# Patient Record
Sex: FEMALE | Race: WHITE | Employment: OTHER | URBAN - METROPOLITAN AREA
[De-identification: names, ages, dates, MRNs, and addresses within clinical notes are randomized per-mention and may not be internally consistent; named-entity substitution may affect disease eponyms.]

---

## 2017-01-11 ENCOUNTER — HOSPITAL ENCOUNTER (OUTPATIENT)
Dept: MAMMOGRAPHY | Age: 67
Discharge: HOME | End: 2017-01-11
Attending: NURSE PRACTITIONER
Payer: MEDICARE

## 2017-01-11 DIAGNOSIS — Z12.31 VISIT FOR SCREENING MAMMOGRAM: ICD-10-CM

## 2017-01-11 PROCEDURE — 77067 SCR MAMMO BI INCL CAD: CPT

## 2017-09-05 ENCOUNTER — HOSPITAL ENCOUNTER (OUTPATIENT)
Dept: ULTRASOUND IMAGING | Age: 67
Discharge: HOME | End: 2017-09-05
Attending: NURSE PRACTITIONER
Payer: MEDICARE

## 2017-09-05 ENCOUNTER — HOSPITAL ENCOUNTER (OUTPATIENT)
Dept: MAMMOGRAPHY | Age: 67
Discharge: HOME | End: 2017-09-05
Attending: NURSE PRACTITIONER
Payer: MEDICARE

## 2017-09-05 DIAGNOSIS — N63.0 LUMP OR MASS IN BREAST: ICD-10-CM

## 2017-09-05 PROCEDURE — 77065 DX MAMMO INCL CAD UNI: CPT

## 2017-09-05 PROCEDURE — 76642 ULTRASOUND BREAST LIMITED: CPT

## 2018-08-14 ENCOUNTER — HOSPITAL ENCOUNTER (OUTPATIENT)
Dept: ULTRASOUND IMAGING | Age: 68
Discharge: HOME | End: 2018-08-14
Attending: NURSE PRACTITIONER
Payer: MEDICARE

## 2018-08-14 ENCOUNTER — HOSPITAL ENCOUNTER (OUTPATIENT)
Dept: MAMMOGRAPHY | Age: 68
Discharge: HOME | End: 2018-08-14
Attending: NURSE PRACTITIONER
Payer: MEDICARE

## 2018-08-14 ENCOUNTER — HOSPITAL ENCOUNTER (OUTPATIENT)
Dept: GENERAL RADIOLOGY | Age: 68
Discharge: HOME | End: 2018-08-14
Attending: NURSE PRACTITIONER
Payer: MEDICARE

## 2018-08-14 DIAGNOSIS — R92.8 ABNORMAL MAMMOGRAM: ICD-10-CM

## 2018-08-14 DIAGNOSIS — M81.0 POSTMENOPAUSAL OSTEOPOROSIS: ICD-10-CM

## 2018-08-14 DIAGNOSIS — Z12.39 SCREENING BREAST EXAMINATION: ICD-10-CM

## 2018-08-14 PROCEDURE — 77063 BREAST TOMOSYNTHESIS BI: CPT

## 2018-08-14 PROCEDURE — 77080 DXA BONE DENSITY AXIAL: CPT

## 2019-01-22 ENCOUNTER — HOSPITAL ENCOUNTER (OUTPATIENT)
Dept: PHYSICAL THERAPY | Age: 69
End: 2019-01-22

## 2019-05-29 ENCOUNTER — HOSPITAL ENCOUNTER (OUTPATIENT)
Dept: PHYSICAL THERAPY | Age: 69
Discharge: HOME | End: 2019-05-29
Payer: MEDICARE

## 2019-05-29 PROCEDURE — 97161 PT EVAL LOW COMPLEX 20 MIN: CPT

## 2019-05-29 PROCEDURE — 97110 THERAPEUTIC EXERCISES: CPT

## 2019-05-29 NOTE — PROGRESS NOTES
[facility] PHYSICAL THERAPY AT [facility] [address]  Phone: [phone]  Fax: [phone] / [address] [facility]  Patient Name: Sissy Smallwood : 1950   Medical   Diagnosis: Other abnormalities of gait and mobility [R26.89] Treatment Diagnosis: Balance/L hip pain   Onset Date: Worsening over past 2 years     Referral Source: Abel Freeman MD Start of [facility]): 2019   Prior Hospitalization: See medical history Provider #: [id number]   Prior Level of Function: Independent but limited secondary to balance   Comorbidities: Hearing impaired   Medications: Verified on Patient Summary List     The Plan of Care and following information is based on the information from the initial evaluation.     ==================================================================================  Assessment / key information:   Sissy Smallwood is a 68 y.o.  yo female with Dx of Other abnormalities of gait and mobility [R26.89]. Patient reports 5 year history of worsening balance, especially with quick movements, walking/moving in the dark, walking with head movements. Patient denies falls, lives with her  in a three story home. Denies difficulty with stair negotiation unless she is carrying something up/down the stairs. C/o left hip pain especially with left sidelying, walking, climbing stiars. Pain at it's worst is 6/10, 0/10 at best, 2/10 currently. Occasionally walking improves the stiffness. Patient reports she has recently been diagnosed with idiopathic neuropathy in her hands and feet. Objectively, the patient demonstrates  Decreased left hip strength 4/5 grossly for hip extension and rotation, presenting with left hip posterior rotation corrected 100% with muscle energy.   She also presents with  decreased static/dynamic functional balance, diminished ambulatory balance (FGA = 22/30 points) and dizziness with ADLs ([address] = 26 points). SLS on Left 3 seconds, R 12 seconds. Increased dizziness with VVI and VSE exercises. Oculomotor tests WNL. Pt will benefit from PT/Vestibular rehab to address these deficits, improve functional independence and reduce dizziness and imbalance with normal daily activities. Thank you for this referral.   ===========================================================================================  Eval Complexity: History MEDIUM  Complexity : 1-2 comorbidities / personal factors will impact the outcome/ POC ;  Examination  HIGH Complexity : 4+ Standardized tests and measures addressing body structure, function, activity limitation and / or participation in recreation ; Presentation LOW Complexity : Stable, uncomplicated ;  Decision Making LOW Complexity : FOTO score of ; Overall Complexity LOW   Problem List: impaired gait/ balance, decrease ADL/ functional abilitiies, decrease activity tolerance and decrease transfer abilities   Treatment Plan may include any combination of the following: Therapeutic exercise, Therapeutic activities, Neuromuscular re-education, Gait/balance training and Patient education  Patient / Family readiness to learn indicated by: asking questions, trying to perform skills and interest  Persons(s) to be included in education: patient (P)  Barriers to Learning/Limitations: None  Measures taken, if barriers to learning:    Patient Goal (s): \"Improve balance and decrease hip pain. \"     Rehabilitation Potential: excellent   Short Term Goals: To be accomplished in 4-6  treatments:  1. Patient will report at least 25% reduction of symptoms with ADLs. 2. Patient will be independent and complaint with HEP TID to reduce imbalance and dizziness with ADLs. 3. Patient will improve SLS to >/= to 15 seconds bilaterally to improve overall balance and decrease fall risk   Long Term Goals: To be accomplished in 10-12 treatments:  1.  Patient will report at least 50% reduction of symptoms with ADLs. 2. Patient will be independent with self progression of HEP and demonstrate willingness to continue HEP after D/C to maximize/maintain gains in functional mobility. 3. FGA will improve to greater then or equal to 25/30 points to demonstrate a significant improvement in ambulatory balance. 4. Patient will improve SLS to > 30 seconds bilaterally on uneven surfaces to improve balance on uneven surfaces and environments in decreased lighting. 5. Patient will demonstrate 5/5 strength in left hip and be independent with self correction to prevent reoccurrence of hip pain  Frequency / Duration:   Patient to be seen  2  times per week for 6-8  weeks:  Patient / Caregiver education and instruction: self care, activity modification and exercises    Therapist Signature: Misael López PT Date: 4/94/3034   Certification Period: 5/29/19 to 8/27/19 Time: 8:28 AM     ===========================================================================================  I certify that the above Physical Therapy Services are being furnished while the patient is under my care. I agree with the treatment plan and certify that this therapy is necessary. Physician Signature:        Date:       Time:     Please sign and return to [facility] or you may fax the signed copy to [phone]. Thank you.

## 2019-05-29 NOTE — PROGRESS NOTES
PHYSICAL THERAPY - DAILY TREATMENT NOTE     Patient Name: Jing Lai        Date: 2019  : 1950   YES Patient  Verified  Visit #:   1     Insurance: Payor: Miguel Mohr / Plan: [place] / Product Type: Medicare /      In time: 1200 Out time: 100   Total Treatment Time: 60     Medicare/BCBS Time Tracking (below)   Total Timed Codes (min):  60 1:1 Treatment Time:  60     TREATMENT AREA =  Other abnormalities of gait and mobility [R26.89]    SUBJECTIVE    Pain Level (on 0 to 10 scale):  2  / 10   Medication Changes/New allergies or changes in medical history, any new surgeries or procedures? NO    If yes, update Summary List   Subjective Functional Status/Changes:  []  No changes reported       See POC         OBJECTIVE  15 min Therapeutic Exercise:  [x]  See flow sheet   Rationale:      increase ROM, improve balance and increase proprioception to improve the patients ability to perform ADL's safely     Billed With/As:   [] TE   [] TA   [] Neuro   [] Self Care Patient Education: [x] Review HEP    [] Progressed/Changed HEP based on:   [] positioning   [] body mechanics   [] transfers   [] heat/ice application    [] other:        Other Objective/Functional Measures:    See POC  See TE Flow sheet     Post Treatment Pain Level (on 0 to 10) scale:   0  / 10     ASSESSMENT    Assessment/Changes in Function:     See POC     []  See Progress Note/Recertification   Patient will continue to benefit from skilled PT services to modify and progress therapeutic interventions, address functional mobility deficits, address ROM deficits, address strength deficits, analyze and address soft tissue restrictions, analyze and cue movement patterns, analyze and modify body mechanics/ergonomics and assess and modify postural abnormalities to attain remaining goals.       Progress toward goals / Updated goals:    See POC     PLAN    [x]  Upgrade activities as tolerated YES Continue plan of care   [] Discharge due to :    []  Other:      Therapist: Jc Dutta PT    Date: 5/29/2019 Time: 8:28 AM     Future Appointments   Date Time Provider Marco Kauffman   5/29/2019 12:00 PM Charu Hawkins PT REHAB CENTER AT [facility]

## 2019-06-03 ENCOUNTER — HOSPITAL ENCOUNTER (OUTPATIENT)
Dept: PHYSICAL THERAPY | Age: 69
Discharge: HOME | End: 2019-06-03
Payer: MEDICARE

## 2019-06-03 PROCEDURE — 97112 NEUROMUSCULAR REEDUCATION: CPT

## 2019-06-03 PROCEDURE — 97110 THERAPEUTIC EXERCISES: CPT

## 2019-06-03 NOTE — PROGRESS NOTES
PHYSICAL THERAPY - DAILY TREATMENT NOTE    Patient Name: Chica Campbell        Date: 6/3/2019  : 1950    Patient  Verified: YES  Visit #:   2   of     Insurance: Payor: Renay Torres / Plan: [place] MEDICARE PART A & B / Product Type: Medicare /      In time: 76 Out time:    Total Treatment Time: 40     Medicare/BCBS Time Tracking (below)   Total Timed Codes (min):  30 1:1 Treatment Time:  30     TREATMENT AREA/ DIAGNOSIS = Other abnormalities of gait and mobility [R26.89]    SUBJECTIVE  Pain Level (on 0 to 10 scale): 1-2  / 10   Medication Changes/New allergies or changes in medical history, any new surgeries or procedures? NO    If yes, update Summary List   Subjective Functional Status/Changes:  []  No changes reported     Functional improvement Pt reports she is doing better with pain, helpful to use a pillow between her legs. Functional limitation At its worst it goes to the knee, especially yardwork and increased standing, walking.        OBJECTIVE  Modalities Rationale:     decrease inflammation and decrease pain to improve patient's ability to perform ADLs   min [] Estim, type/location:                                      []  att     []  unatt     []  w/US     []  w/ice    []  w/heat    min []  Mechanical Traction: type/lbs                   []  pro   []  sup   []  int   []  cont    []  before manual    []  after manual    min []  Ultrasound, settings/location:      min []  Iontophoresis w/ dexamethasone, location:                                               []  take home patch       []  in clinic   10 min [x]  Ice     []  Heat    location/position: Supine to L hip    min []  Vasopneumatic Device, press/temp:     min []  Other:    [x] Skin assessment post-treatment (if applicable):    [x]  intact    [x]  redness- no adverse reaction     []redness - adverse reaction:          15 min Neuromuscular Reeducation:  [x]  See flow sheet   Rationale:      increase strength, improve coordination and improve balance to improve the patients ability to perform ADLs   15 min Therapeutic Exercise:  [x]  See flow sheet   Rationale:      increase ROM and increase strength to improve the patients ability to perform amb with less pain     Billed With/As:   [x] TE   [] TA   [] Neuro   [] Self Care Patient Education: [x] Review HEP    [] Progressed/Changed HEP based on:   [] positioning   [] body mechanics   [] transfers   [] heat/ice application    [] other:        Other Objective/Functional Measures:    Initiated static, dynamic program with min to mod A. Post Treatment Pain Level (on 0 to 10) scale:   1-2 / 10     ASSESSMENT  Assessment/Changes in Function:     Pt challenged with clams, bridges and standing balance activity. Pt with significant improvement in pain level and neutral alignment noted. Alignment noted as neutral after testing with pt demonstrating appropriate post rotation correction PRN. Added clams and hamstrings stretching to HEP. []  See Progress Note/Recertification   Patient will continue to benefit from skilled PT services to modify and progress therapeutic interventions, address functional mobility deficits, address ROM deficits, address strength deficits, analyze and address soft tissue restrictions, analyze and cue movement patterns, analyze and modify body mechanics/ergonomics and address imbalance/dizziness to attain remaining goals. Progress toward goals / Updated goals:    1. Patient will report at least 25% reduction of symptoms with ADLs--10%, good progress.        PLAN  [x]  Upgrade activities as tolerated YES Continue plan of care   []  Discharge due to :    []  Other:      Therapist: Keith Umanzor, PT    Date: 6/3/2019 Time: 11:58 AM     Future Appointments   Date Time Provider Marco Kauffman   6/6/2019 10:30 AM Vidrine, Merline Setting, PT REHAB CENTER AT [facility]   6/13/2019  8:30 AM Ros Bosch, PT REHAB CENTER AT [facility]   6/20/2019  8:30 AM Ros Bosch, PT REHAB CENTER AT [facility]

## 2019-06-06 ENCOUNTER — HOSPITAL ENCOUNTER (OUTPATIENT)
Dept: PHYSICAL THERAPY | Age: 69
Discharge: HOME | End: 2019-06-06
Payer: MEDICARE

## 2019-06-06 PROCEDURE — 97110 THERAPEUTIC EXERCISES: CPT | Performed by: PHYSICAL THERAPIST

## 2019-06-06 PROCEDURE — 97140 MANUAL THERAPY 1/> REGIONS: CPT | Performed by: PHYSICAL THERAPIST

## 2019-06-06 NOTE — PROGRESS NOTES
PHYSICAL THERAPY - DAILY TREATMENT NOTE     Patient Name: Fantasma Galarza        Date: 2019  : 1950   YES Patient  Verified  Visit #:   3   of   12  Insurance: Payor: Jina Bull / Plan: [place] MEDICARE PART A & B / Product Type: Medicare /      In time: 5757 Out time: 1140   Total Treatment Time: 60     Medicare/BCBS Time Tracking (below)   Total Timed Codes (min):  60 1:1 Treatment Time:  50     TREATMENT AREA =  Other abnormalities of gait and mobility [R26.89]    SUBJECTIVE    Pain Level (on 0 to 10 scale):  3  / 10   Medication Changes/New allergies or changes in medical history, any new surgeries or procedures? NO    If yes, update Summary List   Subjective Functional Status/Changes:  []  No changes reported       Functional improvements: complies with HEP. Functional impairments: achiness to the L hip         OBJECTIVE    25 min 1:1/  35 min Therapeutic Exercise:  [x]  See flow sheet   Rationale:      increase ROM and increase strength to improve the patients ability to  improve patient's ability to perform ADL's with decreased pain         25 min Manual Therapy: Technique:      MET to correct R hip innominate downslip, R hip anterior rotation, L on R sacral torsion stuck in extension on L    Rationale:      decrease pain, increase ROM and increase tissue extensibility to improve patient's ability to ambulate without L hip pain. Billed With/As:   [x] TE   [] TA   [] Neuro   [] Self Care Patient Education: [x] Review HEP    [x] Progressed/Changed HEP based on:   [x] positioning   [x] body mechanics   [x] transfers   [] heat/ice application    [x] other: Educated the pt on proper body mechanics for bed mobility and log rolling for sit <-> supine transfers as well as standing core activation. Instructed and issued HEP for core stability. Instructed her to stop performing self correction with L hip innominate.        Other Objective/Functional Measures:    Positive R hip innominate squish test. R ASIS, pube and LE all down - level except R ASIS down indicating a R hip innominate anterior rotation. Level after MET correction. Pt with deep R sacral sulcus and posterior L PAUL and improved with flexion indicating a L on R sacral torsion. Level after MET. Added Pre-pilates mat for increased core stability. Post Treatment Pain Level (on 0 to 10) scale:   2  / 10     ASSESSMENT    Assessment/Changes in Function:     The pt was level after manual, though had lingering L hip achiness exacerbated by weight bearing activities. The pt is likely inflamed due to being out of pelvic alignment. .     []  See Progress Note/Recertification   Patient will continue to benefit from skilled PT services to modify and progress therapeutic interventions, address functional mobility deficits, address ROM deficits, address strength deficits, analyze and address soft tissue restrictions, analyze and cue movement patterns, analyze and modify body mechanics/ergonomics, assess and modify postural abnormalities and address imbalance/dizziness to attain remaining goals. Progress toward goals / Updated goals:    Complies with HEP.      PLAN    [x]  Upgrade activities as tolerated YES Continue plan of care   []  Discharge due to :    []  Other:      Therapist: Bill Jack PT    Date: 6/6/2019 Time: 11:18 AM     Future Appointments   Date Time Provider Marco Kauffman   6/13/2019  8:30 AM Stefano Charlton PT REHAB CENTER AT [facility]   6/20/2019  8:30 AM Stefano Charlton PT REHAB CENTER AT [facility]

## 2019-06-13 ENCOUNTER — HOSPITAL ENCOUNTER (OUTPATIENT)
Dept: PHYSICAL THERAPY | Age: 69
Discharge: HOME | End: 2019-06-13
Payer: MEDICARE

## 2019-06-13 PROCEDURE — 97110 THERAPEUTIC EXERCISES: CPT

## 2019-06-13 PROCEDURE — 97112 NEUROMUSCULAR REEDUCATION: CPT

## 2019-06-13 NOTE — PROGRESS NOTES
PHYSICAL THERAPY - DAILY TREATMENT NOTE     Patient Name: Miguel Hilliard        Date: 2019  : 1950   YES Patient  Verified  Visit #:     Insurance: Payor: Shana Diego / Plan: [place] MEDICARE PART A & B / Product Type: Medicare /      In time: 840 Out time: 004   Total Treatment Time: 35     Medicare/BCBS Time Tracking (below)   Total Timed Codes (min):  35 1:1 Treatment Time: 30     TREATMENT AREA =  Other abnormalities of gait and mobility [R26.89]    SUBJECTIVE    Pain Level (on 0 to 10 scale):  0  / 10   Medication Changes/New allergies or changes in medical history, any new surgeries or procedures? NO    If yes, update Summary List   Subjective Functional Status/Changes:  []  No changes reported   Everything has been helpful, osmar the re-aligning.   Sore after last visit, but better overall           OBJECTIVE      20, 15 1:1 min Therapeutic Exercise:  [x]  See flow sheet   Rationale:      increase ROM, increase strength and inc stability to improve the patients ability to perform ADLs with less pain     15 min Neuromuscular Re-ed: [x]  See flow sheet   Rationale:      improve coordination, improve balance and dec dizziness to improve the patients ability to perform ADLs with less dizziness       Billed With/As:   [x] TE   [] TA   [] Neuro   [] Self Care Patient Education: [x] Review HEP    [] Progressed/Changed HEP based on:   [] positioning   [] body mechanics   [] transfers   [] heat/ice application    [] other:        Other Objective/Functional Measures:    Progressed footing with static ex    SLS:R= 9 sec, L= 18 sec   Post Treatment Pain Level (on 0 to 10) scale:   0  / 10     ASSESSMENT  Assessment/Changes in Function:      Functional improvement:  improving balance/ less dizzy  Functional limitation:             Patient will continue to benefit from skilled PT services to modify and progress therapeutic interventions, address functional mobility deficits, address ROM deficits, address strength deficits, analyze and address soft tissue restrictions and address imbalance/dizziness to attain remaining goals.    Progress toward goals / Updated goals:    Good Progress to    [x] STG    [] LTG  3    []  See Progress Note/Recertification      PLAN    [x]  Upgrade activities as tolerated {YES) Continue plan of care   []  Discharge due to :    []  Other:      Therapist: Bayron Diallo PT    Date: 6/13/2019 Time: 8:40 AM     Future Appointments   Date Time Provider Marco Kauffman   6/17/2019 10:30 AM Lima Aguirre, PT REHAB CENTER AT [facility]   6/20/2019  9:00 AM Bony Krishna, PT REHAB CENTER AT [facility]

## 2019-06-17 ENCOUNTER — HOSPITAL ENCOUNTER (OUTPATIENT)
Dept: PHYSICAL THERAPY | Age: 69
Discharge: HOME | End: 2019-06-17
Payer: MEDICARE

## 2019-06-17 PROCEDURE — 97110 THERAPEUTIC EXERCISES: CPT

## 2019-06-17 PROCEDURE — 97140 MANUAL THERAPY 1/> REGIONS: CPT

## 2019-06-17 PROCEDURE — 97112 NEUROMUSCULAR REEDUCATION: CPT

## 2019-06-17 NOTE — PROGRESS NOTES
PHYSICAL THERAPY - DAILY TREATMENT NOTE     Patient Name: Warden Jalloh        Date: 2019  : 1950   YES Patient  Verified  Visit #: 5  of   12  Insurance: Payor: Cecilia Breath / Plan: [place] MEDICARE PART A & B / Product Type: Medicare /      In time: 9833 Out time: 86   Total Treatment Time: 40     Medicare/BCBS Time Tracking (below)   Total Timed Codes (min):  40 1:1 Treatment Time: 40     TREATMENT AREA =  Other abnormalities of gait and mobility [R26.89]    SUBJECTIVE    Pain Level (on 0 to 10 scale):  0  / 10   Medication Changes/New allergies or changes in medical history, any new surgeries or procedures? NO    If yes, update Summary List   Subjective Functional Status/Changes:  []  No changes reported   Has been back at gym but not back into the habit yet of it. Pt with improved awareness of posture and standing alignment. Significantly improved pain level however, balance is still an issue. Noted with boardwalk art show looking L more challenging for balance. OBJECTIVE    20 min Therapeutic Exercise:  [x]  See flow sheet   Rationale:      increase ROM, increase strength and inc stability   to perform ADLs with less pain     10 min Neuromuscular Re-ed: [x]  See flow sheet   Rationale:      improve coordination, improve balance and dec dizziness to improve the patients ability to perform ADLs with less dizziness     10 min Manual PT: STM L QL, neutral alignment noted   Rationale:      Improve soft tissue mobility o improve the patients ability to perform ADLs with less dizziness   Billed With/As:   [x] TE   [] TA   [] Neuro   [] Self Care Patient Education: [x] Review HEP    [] Progressed/Changed HEP based on:   [] positioning   [] body mechanics   [] transfers   [] heat/ice application    [] other:      Other Objective/Functional Measures:  Alignment noted neutral this day. AROM dysfunctional and painful with lumbar rotation L and SB L.  TTP at L QL, TrP noted.      Post Treatment Pain Level (on 0 to 10) scale:   0  / 10     ASSESSMENT  Assessment/Changes in Function:    Improved L rotation and SB in lumbar region post tx to nonpainful and functional.     Pt ed to reduce lumbar motion with clams to reduce L sided QL and trunk involvement. Patient will continue to benefit from skilled PT services to modify and progress therapeutic interventions, address functional mobility deficits, address ROM deficits, address strength deficits, analyze and address soft tissue restrictions and address imbalance/dizziness to attain remaining goals. Progress toward goals / Updated goals:    1. Patient will report at least 25% reduction of symptoms with ADLs--met  2.  Patient will be independent and complaint with HEP TID to reduce imbalance and dizziness with ADLs--met.        PLAN    [x]  Upgrade activities as tolerated {YES) Continue plan of care   []  Discharge due to :    []  Other:      Therapist: Pool Waggoner PT    Date: 6/17/2019 Time: 12PM     Future Appointments   Date Time Provider Marco Kauffman   6/20/2019  9:00 AM Itzel Alvarado PT REHAB CENTER AT [facility]

## 2019-06-20 ENCOUNTER — HOSPITAL ENCOUNTER (OUTPATIENT)
Dept: PHYSICAL THERAPY | Age: 69
Discharge: HOME | End: 2019-06-20
Payer: MEDICARE

## 2019-06-20 PROCEDURE — 97112 NEUROMUSCULAR REEDUCATION: CPT

## 2019-06-20 PROCEDURE — 97110 THERAPEUTIC EXERCISES: CPT

## 2019-06-20 PROCEDURE — 97140 MANUAL THERAPY 1/> REGIONS: CPT

## 2019-06-20 NOTE — PROGRESS NOTES
PHYSICAL THERAPY - DAILY TREATMENT NOTE     Patient Name: Sanchez Dior        Date: 2019  : 1950   YES Patient  Verified  Visit #:   6      12  Insurance: Payor: Elise Galaviz / Plan: [place] MED[place] & B / Product Type: Medicare /      In time: 900 Out time: 945   Total Treatment Time: 45     Medicare/BCBS Time Tracking (below)   Total Timed Codes (min):  45 1:1 Treatment Time:  45     TREATMENT AREA =  Other abnormalities of gait and mobility [R26.89]    SUBJECTIVE    Pain Level (on 0 to 10 scale):  1  / 10   Medication Changes/New allergies or changes in medical history, any new surgeries or procedures? NO    If yes, update Summary List   Subjective Functional Status/Changes:  []  No changes reported   Stiffness today, but yesterday had quite a bit of L anterior hip pain and down to the knee. Difficulty with climbing stairs.            OBJECTIVE    10 min Manual Therapy: Technique:      [] S/DTM []IASTM []PROM [] Passive Stretching   []manual TPR  [] SOR [] man traction  []Jt manipulation:Gr I [] II []  III [] IV[]   [] OP with REIL    [x] MET for correction of L ant innominateTreatment Area:  LB/ SIJ   Rationale:      decrease pain, increase ROM, increase tissue extensibility and decrease trigger points to improve patient's ability to maintain neutral posture    20 min Therapeutic Exercise:  [x]  See flow sheet   Rationale:      increase ROM and increase strength to improve the patients ability to perform ADLs/ rec activity with min pain     15 min Neuromuscular Re-ed: [x]  See flow sheet   Rationale:      improve coordination, improve balance and dec dizziness to improve the patients ability to perform ADLs with less dizziness                Billed With/As:   [x] TE   [] TA   [x] Neuro   [] Self Care Patient Education: [x] Review HEP    [] Progressed/Changed HEP based on:   [] positioning   [] body mechanics   [] transfers   [] heat/ice application    [] other:        Other Objective/Functional Measures:    L ant/ R post innominate- corrected with MET    Progressed core stability ex, progressed footing with balance   Post Treatment Pain Level (on 0 to 10) scale:   0  / 10     ASSESSMENT  Assessment/Changes in Function:      Functional improvement:  neutral pelvis after manual  Functional limitation:  LOB on several occasions with march/ tandem.             Patient will continue to benefit from skilled PT services to modify and progress therapeutic interventions, address functional mobility deficits, address ROM deficits, address strength deficits, analyze and address soft tissue restrictions, analyze and cue movement patterns and address imbalance/dizziness to attain remaining goals.    Progress toward goals / Updated goals:    Good Progress to    [] STG    [x] LTG  2 as shown by pt report/ demo compliance     []  See Progress Note/Recertification      PLAN    [x]  Upgrade activities as tolerated {YES) Continue plan of care   []  Discharge due to :    []  Other:      Therapist: Ashleigh Cardenas PT    Date: 6/20/2019 Time: 9:01 AM     Future Appointments   Date Time Provider Marco Kauffman   6/27/2019  3:00 PM Epi Thomas PT [facility] AT [facility]   7/5/2019  9:00 AM Leticia Deleon, PT RE[facility]ENTER AT [facility]   7/11/2019  8:30 AM Leticia Deleon PT [facility] AT [facility]   7/18/2019  8:30 AM Leticia Deleon, PT RE[facility] AT [facility]   7/25/2019  8:30 AM Leticia Deleon, PT REHAB [facility]ENTER AT [facility]

## 2019-06-27 ENCOUNTER — HOSPITAL ENCOUNTER (OUTPATIENT)
Dept: PHYSICAL THERAPY | Age: 69
Discharge: HOME | End: 2019-06-27
Payer: MEDICARE

## 2019-06-27 PROCEDURE — 97112 NEUROMUSCULAR REEDUCATION: CPT

## 2019-06-27 PROCEDURE — 97110 THERAPEUTIC EXERCISES: CPT

## 2019-06-27 PROCEDURE — 97140 MANUAL THERAPY 1/> REGIONS: CPT

## 2019-06-27 NOTE — PROGRESS NOTES
PHYSICAL THERAPY - DAILY TREATMENT NOTE     Patient Name: Gianna Llanos        Date: 2019  : 1950   YES Patient  Verified  Visit #:    Insurance: Payor: Binh King / Plan: [place] MEDICARE PART A & B / Product Type: Medicare /      In time: 305 Out time: 405   Total Treatment Time: 60     Medicare/BCBS Time Tracking (below)   Total Timed Codes (min):  60 1:1 Treatment Time:  38     TREATMENT AREA =  Other abnormalities of gait and mobility [R26.89]    SUBJECTIVE    Pain Level (on 0 to 10 scale):  1  / 10   Medication Changes/New allergies or changes in medical history, any new surgeries or procedures? NO    If yes, update Summary List   Subjective Functional Status/Changes:  []  No changes reported   Traveling and had more symptoms in her back and L hip. Yoga and Pilates getting less painful.      OBJECTIVE    10 min Manual Therapy: Technique:      [] S/DTM []IASTM []PROM [] Passive Stretching   []manual TPR  [] SOR [] man traction  []Jt manipulation:Gr I [] II []  III [] IV[]   [] OP with REIL    [x] MET for correction of L ant innominate  Treatment Area:  STM L hip flexion   Rationale:      decrease pain, increase ROM and increase tissue extensibility   to improve patient's ability to maintain neutral posture    35/20 min Therapeutic Exercise:  [x]  See flow sheet   Rationale:      increase ROM and increase strength   to improve the patients ability to perform ADLs/ rec activity with min pain     15/8 min Neuromuscular Re-ed: [x]  See flow sheet   Rationale:      improve coordination, improve balance and dec dizziness   to improve the patients ability to perform ADLs with less dizziness      Billed With/As:   [x] TE   [] TA   [x] Neuro   [] Self Care Patient Education: [x] Review HEP    [] Progressed/Changed HEP based on:   [] positioning   [] body mechanics   [] transfers   [] heat/ice application    [] other:        Other Objective/Functional Measures:  L ant/ R post innominate- corrected with MET mild force, 3 reps. Advanced static and dynamic standing balance. Issued updated HEP. Post Treatment Pain Level (on 0 to 10) scale:   0  / 10     ASSESSMENT  Assessment/Changes in Function:   Pt with updated HEP to include progression of clam to YTB, SLS 3 x 10-30 sec, and hip flexion resisted isometric.           Patient will continue to benefit from skilled PT services to modify and progress therapeutic interventions, address functional mobility deficits, address ROM deficits, address strength deficits, analyze and address soft tissue restrictions, analyze and cue movement patterns, analyze and modify body mechanics/ergonomics and address imbalance/dizziness to attain remaining goals. Progress toward goals / Updated goals:  .  4. Patient will improve SLS to > 30 seconds bilaterally on uneven surfaces to improve balance on uneven surfaces and environments in decreased lighting--good progress, 14 sec R, 24L.       Good Progress to    [] STG    [x] LTG  2       PLAN    [x]  Upgrade activities as tolerated {YES) Continue plan of care   []  Discharge due to :    []  Other:      Therapist: Lillian Roach PT    Date: 6/27/2019 Time: 3pm     Future Appointments   Date Time Provider Marco Kauffman   6/27/2019  3:00 PM Dominique Padilla PT [facility] AT [facility]   7/5/2019  9:00 AM Lorraine Claude, PT RE[facility] AT [facility]   7/11/2019  8:30 AM Lorraine Claude, PT [facility] AT [facility]   7/18/2019  8:30 AM Lorraine Claude, PT [facility] AT [facility]   7/25/2019  8:30 AM Lorraine Claude, PT REH[facility]ENTER AT [facility]

## 2019-07-05 ENCOUNTER — APPOINTMENT (OUTPATIENT)
Dept: PHYSICAL THERAPY | Age: 69
End: 2019-07-05
Payer: MEDICARE

## 2019-07-11 ENCOUNTER — HOSPITAL ENCOUNTER (OUTPATIENT)
Dept: PHYSICAL THERAPY | Age: 69
Discharge: HOME | End: 2019-07-11
Payer: MEDICARE

## 2019-07-11 PROCEDURE — 97112 NEUROMUSCULAR REEDUCATION: CPT

## 2019-07-11 NOTE — PROGRESS NOTES
[facility] AT [facility] [address]  Phone: [phone]  Fax: [phone]  PROGRESS NOTE  Patient Name: Delmar Angulo : 1950   Treatment/Medical Diagnosis: Other abnormalities of gait and mobility [R26.89]   Referral Source: Silvina English MD     Date of Initial Visit: 19 Attended Visits: 8 Missed Visits: 1     SUMMARY OF TREATMENT  PT has consisted of manual therapy for pelvic symmetry, neuro re-education for balance, and patient education of HEP, activity modification  CURRENT STATUS  Patient reports 35% overall improvement. Hip pain has been minimal and Ms. Nilsa King reports ability to stand and walk for several hours without increased pain. She reports more limited progress with balance, especially noted when she walks on uneven surfaces or when carrying objects. Previous Goals:  1. Patient will report at least 50% reduction of symptoms with ADLs. 2. Patient will be independent with self progression of HEP and demonstrate willingness to continue HEP after D/C to maximize/maintain gains in functional mobility. 3. FGA will improve to greater then or equal to 25/30 points to demonstrate a significant improvement in ambulatory balance. 4. Patient will improve SLS to > 30 seconds bilaterally on uneven surfaces to improve balance on uneven surfaces and environments in decreased lighting. 5. Patient will demonstrate 5/5 strength in left hip and be independent with self correction to prevent reoccurrence of hip pain       Prior Level/Current Level:  1) Prior Level: na   Current Level: 35%   Goal Met? progressing  2) Prior Level: na   Current Level: pt reports good compliance with hip ex, partial compliance with balance ex   Goal Met? Partially met  3) Prior Level:    Current Level: 25/30   Goal Met? yes  4) Prior Level: R=12 sec, L= 3 sec   Current Level: R= 21 sec, L= 27 sec    Goal Met? progressing  4) Prior Level: 4/5   Current Level: 4+/5   Goal Met? progressing      New Goals to be achieved in __2-4__  weeks:  Continue towards LTG 1,2,4,5  3. FGA will improve to greater then or equal to 27/30 points to demonstrate a significant improvement in ambulatory balance. RECOMMENDATIONS  Continue PT per current POC for 4-6 addl visits. Anticipate d/c with HEP at that time. If you have any questions/comments please contact us directly at [phone]. Thank you for allowing us to assist in the care of your patient. Therapist Signature: Nelson Galvan, PT Date: 7/11/2019   Reporting Period:  Certification Period:   5/29/19 to 7/11/19 5/29/19 to 8/27/19 Time: 9:35 AM   NOTE TO PHYSICIAN:  PLEASE COMPLETE THE ORDERS BELOW AND FAX TO   [facility] at [address]: [phone]. If you are unable to process this request in 24 hours please contact our office: [phone].    ___ I have read the above report and request that my patient continue as recommended.   ___ I have read the above report and request that my patient continue therapy with the following changes/special instructions:_________________________________________   ___ I have read the above report and request that my patient be discharged from therapy.      Physician Signature:        Date:       Time:

## 2019-07-11 NOTE — PROGRESS NOTES
PHYSICAL THERAPY - DAILY TREATMENT NOTE     Patient Name: Constantino Congress        Date: 2019  : 1950   YES Patient  Verified  Visit #:     Insurance: Payor: Angelina Members / Plan: [place] MEDICARE PART A & B / Product Type: Medicare /      In time: 362 Out time: 920   Total Treatment Time: 50     Medicare/BCBS Time Tracking (below)   Total Timed Codes (min):  50 1:1 Treatment Time:  35     TREATMENT AREA =  Other abnormalities of gait and mobility [R26.89]    SUBJECTIVE    Pain Level (on 0 to 10 scale):  1  / 10   Medication Changes/New allergies or changes in medical history, any new surgeries or procedures? NO    If yes, update Summary List   Subjective Functional Status/Changes:  []  No changes reported   PT has been helpful. Hip pain is better. Balance ex takes more effort. Overall- 35-40% improved.              OBJECTIVE      5 min Manual Therapy: Technique:      [x] S/DTM []IASTM []PROM [] Passive Stretching   [x]manual TPR  [] SOR [] man traction  []Jt manipulation:Gr I [] II []  III [] IV[]   [] OP with REIL    []   Treatment Area:  LB/ L glut   Rationale:      decrease pain, increase ROM, increase tissue extensibility and decrease trigger points to improve patient's ability to perform ADLs with less pain    nc min Therapeutic Exercise:  [x]  See flow sheet   Rationale:      increase ROM, increase strength and improve balance to improve the patients ability to amb with improved safety/ perform ADLs with less pain     30 min Neuromuscular Re-ed: [x]  See flow sheet   Rationale:      increase strength, improve coordination and improve balance to improve the patients ability to amb/ transition safely     *    Billed With/As:   [x] TE   [] TA   [x] Neuro   [] Self Care Patient Education: [x] Review HEP    [] Progressed/Changed HEP based on:   [] positioning   [] body mechanics   [] transfers   [] heat/ice application    [] other:        Other Objective/Functional Measures:    MMT (R/L):: hip abd= 4+/ 4+, hip ext= 4+/ 4+     SLS: R=21 sec, L= 27 sec    FGA= 25/30     SLS: R= Post Treatment Pain Level (on 0 to 10) scale:   0  / 10     ASSESSMENT  Assessment/Changes in Function:      Functional improvement:less hip pain with standing and walking. Able to stand several hours with no increase in pain  Functional limitation:  Balance is still limited- difficult to walk and talk to people.   Difficult to walk and carry a suitcASE.  gET THROWN OFF ON UNEVEN SURFACE.            Progress toward goals / Updated goals:         [x]  See Progress Note/Recertification      PLAN    [x]  Upgrade activities as tolerated {YES) Continue plan of care   []  Discharge due to :    []  Other:      Therapist: Selwyn Casillas PT    Date: 7/11/2019 Time: 8:30 AM     Future Appointments   Date Time Provider Marco Kauffman   7/18/2019  8:30 AM Sal De Leon PT [facility] AT [facility]   7/25/2019  8:30 AM Sal De Leon PT RE[facility] AT [facility]

## 2019-07-15 ENCOUNTER — HOSPITAL ENCOUNTER (OUTPATIENT)
Dept: PHYSICAL THERAPY | Age: 69
Discharge: HOME | End: 2019-07-15
Payer: MEDICARE

## 2019-07-15 PROCEDURE — 97112 NEUROMUSCULAR REEDUCATION: CPT

## 2019-07-15 PROCEDURE — 97110 THERAPEUTIC EXERCISES: CPT

## 2019-07-15 NOTE — PROGRESS NOTES
PHYSICAL THERAPY - DAILY TREATMENT NOTE     Patient Name: Lazaro Garland        Date: 7/15/2019  : 1950   YES Patient  Verified  Visit #:     Insurance: Payor: Roc García / Plan: [place] MEDICARE PART A & B / Product Type: Medicare /      In time: 2993 Out time: 1055   Total Treatment Time: 50     Medicare/BCBS Time Tracking (below)   Total Timed Codes (min):  50 1:1 Treatment Time:  45     TREATMENT AREA =  Other abnormalities of gait and mobility [R26.89]    SUBJECTIVE    Pain Level (on 0 to 10 scale):  4  / 10   Medication Changes/New allergies or changes in medical history, any new surgeries or procedures? NO    If yes, update Summary List   Subjective Functional Status/Changes:  []  No changes reported       Functional improvements: Overall I feel better but I did a lot this weekend and I'm really sore. \"  Functional impairments: Decreased balance, core strength affecting ADL's         OBJECTIVE  20/5 min Therapeutic Exercise:  [x]  See flow sheet   Rationale:      increase ROM and increase strength to improve the patients ability to perform ADL's     25 min Neuromuscular Re-ed: [x]  See flow sheet   Rationale:      improve coordination, improve balance and increase proprioception to improve the patients ability to decrease fall risk     Billed With/As:   [x] TE   [] TA   [] Neuro   [] Self Care Patient Education: [x] Review HEP    [] Progressed/Changed HEP based on:   [] positioning   [x] body mechanics   [] transfers   [] heat/ice application    [x] other: MET to correct L post rotation       Other Objective/Functional Measures:    Core strength with standing dynamic balance   Post Treatment Pain Level (on 0 to 10) scale:   0  / 10     ASSESSMENT    Assessment/Changes in Function:     100% correction today. Patient independent with self correction and assessment. Improved balance and proprioception noted with neuromuscular re ed today.      []  See Progress Note/Recertification Patient will continue to benefit from skilled PT services to modify and progress therapeutic interventions, address functional mobility deficits, address ROM deficits, address strength deficits, analyze and address soft tissue restrictions, analyze and cue movement patterns, analyze and modify body mechanics/ergonomics and assess and modify postural abnormalities to attain remaining goals. Progress toward goals / Updated goals:    Good progress towards newly updated goals.      PLAN    [x]  Upgrade activities as tolerated YES Continue plan of care   []  Discharge due to :    []  Other:      Therapist: Madi Morataya PT    Date: 7/15/2019 Time: 9:55 AM     Future Appointments   Date Time Provider Marco Kauffman   7/15/2019 10:00 AM Dat Prajapati, PT [facility] AT [facility]   7/18/2019  8:30 AM Anastasiia Lucio, PT RE[facility] AT [facility]   7/23/2019 11:00 AM Tres Matthews PT RE[facility] AT [facility]   7/25/2019  8:30 AM Anastasiia Lucio, PT RE[facility] AT [facility]

## 2019-07-18 ENCOUNTER — APPOINTMENT (OUTPATIENT)
Dept: PHYSICAL THERAPY | Age: 69
End: 2019-07-18
Payer: MEDICARE

## 2019-07-23 ENCOUNTER — HOSPITAL ENCOUNTER (OUTPATIENT)
Dept: PHYSICAL THERAPY | Age: 69
Discharge: HOME | End: 2019-07-23
Payer: MEDICARE

## 2019-07-23 PROCEDURE — 97110 THERAPEUTIC EXERCISES: CPT

## 2019-07-23 PROCEDURE — 97112 NEUROMUSCULAR REEDUCATION: CPT

## 2019-07-23 NOTE — PROGRESS NOTES
PHYSICAL THERAPY - DAILY TREATMENT NOTE     Patient Name: Giorgi Coelho        Date: 2019  : 1950   YES Patient  Verified  Visit #:   10   of   12  Insurance: Payor: Dana Del Valle / Plan: [place] MEDICARE PART A & B / Product Type: Medicare /      In time: 4 Out time: 1120   Total Treatment Time: 50     Medicare/BCBS Time Tracking (below)   Total Timed Codes (min):  40 1:1 Treatment Time:  40     TREATMENT AREA =  Other abnormalities of gait and mobility [R26.89]    SUBJECTIVE    Pain Level (on 0 to 10 scale): 2 / 10   Medication Changes/New allergies or changes in medical history, any new surgeries or procedures? NO    If yes, update Summary List   Subjective Functional Status/Changes:  []  No changes reported     Functional improvements: Was noting more improvements, noting more symptoms recently though related to increased activity with company. Functional impairments:  Pt noting more symptoms with increased activity of standing. OBJECTIVE  35/25 min Therapeutic Exercise:  [x]  See flow sheet   Rationale:      increase ROM and increase strength to improve the patients ability to perform ADL's     15 min Neuromuscular Re-ed: [x]  See flow sheet   Rationale:      improve coordination, improve balance and increase proprioception to improve the patients ability to decrease fall risk     Billed With/As:   [x] TE   [] TA   [] Neuro   [] Self Care Patient Education: [x] Review HEP    [] Progressed/Changed HEP based on:   [] positioning   [x] body mechanics   [] transfers   [] heat/ice application    [x] other: MET to correct L post rotation       Other Objective/Functional Measures:  Advanced reps for leg slides, bridges. Able to advance bridges with hold x 3 sec. Post Treatment Pain Level (on 0 to 10) scale:   0  / 10     ASSESSMENT    Assessment/Changes in Function:     Pt alignment noted pre and post exercises this day. Good progress toward all goals.      []  See Progress Note/Recertification   Patient will continue to benefit from skilled PT services to modify and progress therapeutic interventions, address functional mobility deficits, address ROM deficits, address strength deficits, analyze and address soft tissue restrictions, analyze and cue movement patterns, analyze and modify body mechanics/ergonomics and assess and modify postural abnormalities to attain remaining goals. Progress toward goals / Updated goals:  1.  Patient will report at least 50% reduction of symptoms with ADLs--met     PLAN    [x]  Upgrade activities as tolerated YES Continue plan of care   []  Discharge due to :    []  Other:      Therapist: Josseline Roper, PT    Date: 7/23/2019 Time: 10:30 AM     Future Appointments   Date Time Provider Marco Kauffman   7/23/2019 11:00 AM Coty Oleary, PT REHAB CENTER AT [address]   7/25/2019  8:30 AM Fritz Trujillo, PT REHAB CENTER AT [address]

## 2019-07-25 ENCOUNTER — APPOINTMENT (OUTPATIENT)
Dept: PHYSICAL THERAPY | Age: 69
End: 2019-07-25
Payer: MEDICARE

## 2019-08-19 ENCOUNTER — HOSPITAL ENCOUNTER (OUTPATIENT)
Dept: PHYSICAL THERAPY | Age: 69
Discharge: HOME | End: 2019-08-19
Payer: MEDICARE

## 2019-08-19 PROCEDURE — 97110 THERAPEUTIC EXERCISES: CPT

## 2019-08-19 NOTE — PROGRESS NOTES
[address] PHYSICAL THERAPY AT [facility] [address] [place], [address]  Phone: [phone]  Fax: [phone]  DISCHARGE SUMMARY FOR PHYSICAL THERAPY          Patient Name: Anisa Machado : 1950   Treatment/Medical Diagnosis: Other abnormalities of gait and mobility [R26.89]   Onset Date:     Referral Source: Ajit Morillo MD [facility]): 19   Prior Hospitalization: See Medical History Provider #: [id number]   Prior Level of Function: Independent but limited secondary to balance   Comorbidities: Hearing impaired   Medications: Verified on Patient Summary List   Visits from [facility]: 11 Missed Visits: 0     Previous Goals:  1. Patient will report at least 50% reduction of symptoms with ADLs. 2. Patient will be independent with self progression of HEP and demonstrate willingness to continue HEP after D/C to maximize/maintain gains in functional mobility. 3. FGA will improve to greater then or equal to 27/30 points to demonstrate a significant improvement in ambulatory balance. 4. Patient will improve SLS to > 30 seconds bilaterally on uneven surfaces to improve balance on uneven surfaces and environments in decreased lighting. 5. Patient will demonstrate 5/5 strength in left hip and be independent with self correction to prevent reoccurrence of hip pain   Prior Level/Current Level:  1) Prior Level: 35   Current Level: 50   Goal Met? yes  2) Prior Level:pt reported good compliance with hip ex, partial compliance with balance ex   Current Level: I and compliant   Goal Met? yes  3) Prior Level: 25   Current Level: 25   Goal Met? no  4) Prior Level: R= 21 sec, L= 27 sec    Current Level:30 sec B   Goal Met? Yes  5) Prior Level:left hip strength 4/5 grossly for hip extension and rotation   Current Level:5/5   Goal Met? yes      Key Functional Changes/Progress: Pt with improved strength and function since last update.  She demonstrates independence with current HEP to address balance and safety with amb in community. She is I with management of symptoms and will be D/C at this time. Assessments/Recommendations: Discontinue therapy. Progressing towards or have reached established goals. If you have any questions/comments please contact us directly at [phone]. Thank you for allowing us to assist in the care of your patient.     Therapist Signature: Kerri Valverde, ERNIE Date: 8/19/19   Reporting Period: 5/29/19 to 8/19/19 Time: 2:50 PM

## 2019-08-19 NOTE — PROGRESS NOTES
PHYSICAL THERAPY - DAILY TREATMENT NOTE     Patient Name: Jessica No        Date: 2019  : 1950   YES Patient  Verified  Visit #:     Insurance: Payor: Sarah High / Plan: [place] PART [place] & B / Product Type: Medicare /      In time: 4 Out time: 11    Total Treatment Time: 30     Medicare/BCBS Time Tracking (below)   Total Timed Codes (min):  30 1:1 Treatment Time:  30     TREATMENT AREA =  Other abnormalities of gait and mobility [R26.89]    SUBJECTIVE    Pain Level (on 0 to 10 scale): 2 / 10   Medication Changes/New allergies or changes in medical history, any new surgeries or procedures? NO    If yes, update Summary List   Subjective Functional Status/Changes:  []  No changes reported   Reports she had to stand more and noted some pain in her lumbar spine and hip. Improving symptoms overall and able to stand and walk with increased duration with less pain. She is returning to PT after ~ 1 month to assess response to HEP for management of symptoms. OBJECTIVE  30 min Therapeutic Exercise:  [x]  See flow sheet   Rationale:      increase ROM and increase strength to improve the patients ability to perform ADL's     Billed With/As:   [x] TE   [] TA   [] Neuro   [] Self Care Patient Education: [x] Review HEP    [] Progressed/Changed HEP based on:   [x] positioning   [x] body mechanics   [x] transfers   [] heat/ice application            Other Objective/Functional Measures:    See D/C. Reviewed and discussed overall progression for activity to continue progression to improved function.      Post Treatment Pain Level (on 0 to 10) scale:   0  / 10     ASSESSMENT    Assessment/Changes in Function:     See D/C     []  See Progress Note/Recertification   Patient will continue to benefit from skilled PT services to modify and progress therapeutic interventions, address functional mobility deficits, address ROM deficits, address strength deficits, analyze and address soft tissue restrictions, analyze and cue movement patterns, analyze and modify body mechanics/ergonomics and assess and modify postural abnormalities to attain remaining goals.       Progress toward goals / Updated goals:  See D/C     PLAN    []  Upgrade activities as tolerated no Continue plan of care   [x]  Discharge due to : Meeting all 4/5 LTGs   []  Other:      Therapist: Eloy Wiley PT    Date: 8/19/2019 Time: 10:54 AM     Future Appointments   Date Time Provider Marco Kauffman   8/19/2019 10:30 AM Loreen Halsted, PT [facility]

## 2019-08-28 ENCOUNTER — HOSPITAL ENCOUNTER (OUTPATIENT)
Dept: MAMMOGRAPHY | Age: 69
Discharge: HOME | End: 2019-08-28
Attending: NURSE PRACTITIONER
Payer: MEDICARE

## 2019-08-28 DIAGNOSIS — Z12.31 VISIT FOR SCREENING MAMMOGRAM: ICD-10-CM

## 2019-08-28 PROCEDURE — 77063 BREAST TOMOSYNTHESIS BI: CPT

## 2020-09-08 ENCOUNTER — HOSPITAL ENCOUNTER (OUTPATIENT)
Dept: MAMMOGRAPHY | Age: 70
Discharge: HOME | End: 2020-09-08
Payer: MEDICARE

## 2020-09-08 DIAGNOSIS — Z12.31 VISIT FOR SCREENING MAMMOGRAM: ICD-10-CM

## 2020-09-08 PROCEDURE — 77063 BREAST TOMOSYNTHESIS BI: CPT

## 2021-09-16 ENCOUNTER — HOSPITAL ENCOUNTER (OUTPATIENT)
Dept: WOMENS IMAGING | Age: 71
Discharge: HOME | End: 2021-09-16
Attending: NURSE PRACTITIONER
Payer: MEDICARE

## 2021-09-16 DIAGNOSIS — Z12.31 BREAST CANCER SCREENING BY MAMMOGRAM: ICD-10-CM

## 2021-09-16 PROCEDURE — 77063 BREAST TOMOSYNTHESIS BI: CPT

## 2022-09-14 ENCOUNTER — TRANSCRIBE ORDER (OUTPATIENT)
Dept: SCHEDULING | Age: 72
End: 2022-09-14

## 2022-09-20 ENCOUNTER — TRANSCRIBE ORDER (OUTPATIENT)
Dept: SCHEDULING | Age: 72
End: 2022-09-20

## 2022-09-20 DIAGNOSIS — M85.80 OTHER SPECIFIED DISORDERS OF BONE DENSITY AND STRUCTURE, UNSPECIFIED SITE: Primary | ICD-10-CM

## 2022-10-04 ENCOUNTER — TRANSCRIBE ORDER (OUTPATIENT)
Dept: SCHEDULING | Age: 72
End: 2022-10-04

## 2022-10-04 DIAGNOSIS — Z78.0 OSTEOPENIA AFTER MENOPAUSE: Primary | ICD-10-CM

## 2022-10-04 DIAGNOSIS — M85.80 OSTEOPENIA AFTER MENOPAUSE: Primary | ICD-10-CM

## 2022-10-04 DIAGNOSIS — Z78.0 POSTMENOPAUSAL: ICD-10-CM

## 2022-10-21 ENCOUNTER — HOSPITAL ENCOUNTER (OUTPATIENT)
Dept: BONE DENSITY | Age: 72
Discharge: HOME | End: 2022-10-21
Attending: NURSE PRACTITIONER
Payer: MEDICARE

## 2022-10-21 ENCOUNTER — HOSPITAL ENCOUNTER (OUTPATIENT)
Dept: WOMENS IMAGING | Age: 72
Discharge: HOME | End: 2022-10-21
Attending: NURSE PRACTITIONER
Payer: MEDICARE

## 2022-10-21 DIAGNOSIS — M85.80 OSTEOPENIA AFTER MENOPAUSE: ICD-10-CM

## 2022-10-21 DIAGNOSIS — Z12.31 VISIT FOR SCREENING MAMMOGRAM: ICD-10-CM

## 2022-10-21 DIAGNOSIS — Z78.0 OSTEOPENIA AFTER MENOPAUSE: ICD-10-CM

## 2022-10-21 DIAGNOSIS — Z78.0 POSTMENOPAUSAL: ICD-10-CM

## 2022-10-21 PROCEDURE — 77063 BREAST TOMOSYNTHESIS BI: CPT

## 2022-10-21 PROCEDURE — 77080 DXA BONE DENSITY AXIAL: CPT

## 2023-01-28 ENCOUNTER — TRANSCRIBE ORDERS (OUTPATIENT)
Facility: HOSPITAL | Age: 73
End: 2023-01-28

## 2023-01-28 DIAGNOSIS — Z12.31 VISIT FOR SCREENING MAMMOGRAM: Primary | ICD-10-CM

## 2023-10-26 ENCOUNTER — HOSPITAL ENCOUNTER (OUTPATIENT)
Dept: WOMENS IMAGING | Facility: HOSPITAL | Age: 73
Discharge: HOME | End: 2023-10-26
Payer: MEDICARE

## 2023-10-26 DIAGNOSIS — Z12.31 VISIT FOR SCREENING MAMMOGRAM: ICD-10-CM

## 2023-10-26 PROCEDURE — 77063 BREAST TOMOSYNTHESIS BI: CPT

## 2025-01-24 ENCOUNTER — TRANSCRIBE ORDERS (OUTPATIENT)
Facility: HOSPITAL | Age: 75
End: 2025-01-24

## 2025-01-24 DIAGNOSIS — Z12.31 OTHER SCREENING MAMMOGRAM: Primary | ICD-10-CM

## 2025-02-06 ENCOUNTER — HOSPITAL ENCOUNTER (OUTPATIENT)
Dept: WOMENS IMAGING | Facility: HOSPITAL | Age: 75
Discharge: HOME | End: 2025-02-09
Payer: MEDICARE

## 2025-02-06 DIAGNOSIS — Z12.31 OTHER SCREENING MAMMOGRAM: ICD-10-CM

## 2025-02-06 PROCEDURE — 77063 BREAST TOMOSYNTHESIS BI: CPT
